# Patient Record
Sex: FEMALE | Race: WHITE | NOT HISPANIC OR LATINO | URBAN - METROPOLITAN AREA
[De-identification: names, ages, dates, MRNs, and addresses within clinical notes are randomized per-mention and may not be internally consistent; named-entity substitution may affect disease eponyms.]

---

## 2017-05-19 ENCOUNTER — OUTPATIENT (OUTPATIENT)
Dept: OUTPATIENT SERVICES | Facility: HOSPITAL | Age: 27
LOS: 1 days | Discharge: HOME | End: 2017-05-19

## 2017-06-28 DIAGNOSIS — Z00.01 ENCOUNTER FOR GENERAL ADULT MEDICAL EXAMINATION WITH ABNORMAL FINDINGS: ICD-10-CM

## 2017-06-28 DIAGNOSIS — R76.0 RAISED ANTIBODY TITER: ICD-10-CM

## 2017-06-28 DIAGNOSIS — R53.83 OTHER FATIGUE: ICD-10-CM

## 2017-06-28 DIAGNOSIS — M25.50 PAIN IN UNSPECIFIED JOINT: ICD-10-CM

## 2017-06-28 DIAGNOSIS — R63.5 ABNORMAL WEIGHT GAIN: ICD-10-CM

## 2017-07-05 ENCOUNTER — OUTPATIENT (OUTPATIENT)
Dept: OUTPATIENT SERVICES | Facility: HOSPITAL | Age: 27
LOS: 1 days | Discharge: HOME | End: 2017-07-05

## 2017-07-05 DIAGNOSIS — E04.9 NONTOXIC GOITER, UNSPECIFIED: ICD-10-CM

## 2018-01-16 ENCOUNTER — OUTPATIENT (OUTPATIENT)
Dept: OUTPATIENT SERVICES | Facility: HOSPITAL | Age: 28
LOS: 1 days | Discharge: HOME | End: 2018-01-16

## 2018-01-16 DIAGNOSIS — M25.511 PAIN IN RIGHT SHOULDER: ICD-10-CM

## 2018-09-22 ENCOUNTER — OUTPATIENT (OUTPATIENT)
Dept: OUTPATIENT SERVICES | Facility: HOSPITAL | Age: 28
LOS: 1 days | Discharge: HOME | End: 2018-09-22

## 2018-09-23 DIAGNOSIS — Z00.00 ENCOUNTER FOR GENERAL ADULT MEDICAL EXAMINATION WITHOUT ABNORMAL FINDINGS: ICD-10-CM

## 2018-09-23 DIAGNOSIS — R63.5 ABNORMAL WEIGHT GAIN: ICD-10-CM

## 2018-10-13 ENCOUNTER — EMERGENCY (EMERGENCY)
Facility: HOSPITAL | Age: 28
LOS: 0 days | Discharge: HOME | End: 2018-10-14
Attending: EMERGENCY MEDICINE | Admitting: EMERGENCY MEDICINE

## 2018-10-13 VITALS
OXYGEN SATURATION: 99 % | TEMPERATURE: 98 F | HEART RATE: 72 BPM | SYSTOLIC BLOOD PRESSURE: 127 MMHG | RESPIRATION RATE: 18 BRPM | HEIGHT: 65 IN | DIASTOLIC BLOOD PRESSURE: 84 MMHG | WEIGHT: 184.97 LBS

## 2018-10-13 DIAGNOSIS — R42 DIZZINESS AND GIDDINESS: ICD-10-CM

## 2018-10-13 DIAGNOSIS — R11.2 NAUSEA WITH VOMITING, UNSPECIFIED: ICD-10-CM

## 2018-10-13 DIAGNOSIS — M79.10 MYALGIA, UNSPECIFIED SITE: ICD-10-CM

## 2018-10-13 DIAGNOSIS — R50.9 FEVER, UNSPECIFIED: ICD-10-CM

## 2018-10-13 DIAGNOSIS — R51 HEADACHE: ICD-10-CM

## 2018-10-13 RX ORDER — MECLIZINE HCL 12.5 MG
25 TABLET ORAL ONCE
Qty: 0 | Refills: 0 | Status: COMPLETED | OUTPATIENT
Start: 2018-10-13 | End: 2018-10-13

## 2018-10-13 RX ORDER — ACETAMINOPHEN 500 MG
975 TABLET ORAL ONCE
Qty: 0 | Refills: 0 | Status: COMPLETED | OUTPATIENT
Start: 2018-10-13 | End: 2018-10-13

## 2018-10-13 RX ADMIN — Medication 975 MILLIGRAM(S): at 23:00

## 2018-10-13 RX ADMIN — Medication 25 MILLIGRAM(S): at 22:00

## 2018-10-13 NOTE — ED PROVIDER NOTE - CARE PLAN
Principal Discharge DX:	Disequilibrium  Assessment and plan of treatment:	Take Meclizine 1 tab up three times a day as needed for dizziness. Follow up with Neurology within 1 week (referral list provided). Return to the ED if you have a severe headache that does not go away, you have new changes in your vision, feel weak or confused, you have problems hearing, you have ringing or buzzing in your ears, have uncontrolled nausea or vomiting or for any other concern.

## 2018-10-13 NOTE — ED PROVIDER NOTE - OBJECTIVE STATEMENT
28F otherwise well endorsing "disequilibrium" 28F otherwise well endorsing "disequilibrium" for 1 week. Described as feeling off balance. Provoked by reading phone. Fast movement of head. Prolonged standing. Occasionally associated with left sided headache. No headache currently. vertigo, change in hearing, pain with jaw movement. No syncope. +n/v yesterday x 1. +myalgia for two days which resolved yesterday. Pt had documented fever at night 100.4 3 nights ago. Also endorsing body aches. No rash. Pt reports two weeks ago she noticed a tick fall out of her hair that was engorged with blood. No sick contacts. No recent travel. no neck pain.

## 2018-10-13 NOTE — ED ADULT TRIAGE NOTE - CHIEF COMPLAINT QUOTE
Patient states for the past week her equilibrium is off, she experienced 2 days out of the week body aches and fever, and she just feels off

## 2018-10-13 NOTE — ED PROVIDER NOTE - PLAN OF CARE
Take Meclizine 1 tab up three times a day as needed for dizziness. Follow up with Neurology within 1 week (referral list provided). Return to the ED if you have a severe headache that does not go away, you have new changes in your vision, feel weak or confused, you have problems hearing, you have ringing or buzzing in your ears, have uncontrolled nausea or vomiting or for any other concern.

## 2018-10-13 NOTE — ED PROVIDER NOTE - PHYSICAL EXAMINATION
VITAL SIGNS: noted  CONSTITUTIONAL: Well-developed; well-nourished; in no acute distress  HEAD: Normocephalic; atraumatic  EYES: PERRL, EOM intact; conjunctiva and sclera clear, no nystagmus. smooth saccade. No photophobia  ENT: No nasal discharge; airway clear. MMM  NECK: Supple; non tender. No anterior cervical lymphadenopathy noted  CARD: S1, S2 normal; no murmurs, gallops, or rubs. Regular rate and rhythm  RESP: CTAB/L, no wheezes, rales or rhonchi  ABD: Normal bowel sounds; soft; non-distended; non-tender; no hepatosplenomegaly. No CVA tenderness  EXT: Normal ROM. No calf tenderness or edema. Distal pulses intact  NEURO: AAO x 3, normal speech, no facial asymmetry, negative pronator drift, no ataxia, negative Romberg, no dysdiadokinesia, no nystagmus, peripheral vision intact, sensory equal and intact. finger to nose intact. can tandem gate, heel toe walk.   SKIN: Skin exam is warm and dry, no acute rash  MS: No midline spinal tenderness

## 2018-10-13 NOTE — ED PROVIDER NOTE - MEDICAL DECISION MAKING DETAILS
subjective disequilibrium in setting of known tick exposure and 1 episode of fever at night. No erythema migrans. No myalgia or headache or fever currently. No meningismus, neuro intact. Consider lyme vs optho vs post viral vertigo vs peripheral vertigo less likely central process. -lyme titer, antivert, upreg. reassess.

## 2018-10-14 VITALS
SYSTOLIC BLOOD PRESSURE: 99 MMHG | HEART RATE: 77 BPM | RESPIRATION RATE: 17 BRPM | OXYGEN SATURATION: 99 % | DIASTOLIC BLOOD PRESSURE: 61 MMHG | TEMPERATURE: 98 F

## 2018-10-14 RX ORDER — MECLIZINE HCL 12.5 MG
1 TABLET ORAL
Qty: 12 | Refills: 0 | OUTPATIENT
Start: 2018-10-14 | End: 2018-10-17

## 2018-10-14 NOTE — ED ADULT NURSE REASSESSMENT NOTE - NS ED NURSE REASSESS COMMENT FT1
pt is requesting her [facility], MD aware.

## 2018-10-15 LAB
B BURGDOR C6 AB SER-ACNC: NEGATIVE — SIGNIFICANT CHANGE UP
B BURGDOR IGG+IGM SER-ACNC: 0.06 INDEX — SIGNIFICANT CHANGE UP (ref 0.01–0.89)
LYME C6 AB IGG/IGM EIA REFLEX WESTERN BL: SIGNIFICANT CHANGE UP

## 2018-11-12 PROBLEM — N83.209 UNSPECIFIED OVARIAN CYST, UNSPECIFIED SIDE: Chronic | Status: ACTIVE | Noted: 2018-10-13

## 2018-11-15 ENCOUNTER — OUTPATIENT (OUTPATIENT)
Dept: OUTPATIENT SERVICES | Facility: HOSPITAL | Age: 28
LOS: 1 days | Discharge: HOME | End: 2018-11-15

## 2018-11-15 DIAGNOSIS — M25.361 OTHER INSTABILITY, RIGHT KNEE: ICD-10-CM

## 2018-11-15 DIAGNOSIS — M25.561 PAIN IN RIGHT KNEE: ICD-10-CM

## 2019-01-16 ENCOUNTER — OUTPATIENT (OUTPATIENT)
Dept: OUTPATIENT SERVICES | Facility: HOSPITAL | Age: 29
LOS: 1 days | Discharge: HOME | End: 2019-01-16

## 2019-01-16 VITALS
HEART RATE: 70 BPM | OXYGEN SATURATION: 99 % | WEIGHT: 184.97 LBS | RESPIRATION RATE: 16 BRPM | DIASTOLIC BLOOD PRESSURE: 72 MMHG | TEMPERATURE: 97 F | SYSTOLIC BLOOD PRESSURE: 118 MMHG | HEIGHT: 65 IN

## 2019-01-16 DIAGNOSIS — E01.8 OTHER IODINE-DEFICIENCY RELATED THYROID DISORDERS AND ALLIED CONDITIONS: ICD-10-CM

## 2019-01-16 DIAGNOSIS — M25.561 PAIN IN RIGHT KNEE: ICD-10-CM

## 2019-01-16 DIAGNOSIS — Z01.818 ENCOUNTER FOR OTHER PREPROCEDURAL EXAMINATION: ICD-10-CM

## 2019-01-16 LAB
BASOPHILS # BLD AUTO: 0.04 K/UL — SIGNIFICANT CHANGE UP (ref 0–0.2)
BASOPHILS NFR BLD AUTO: 0.6 % — SIGNIFICANT CHANGE UP (ref 0–1)
EOSINOPHIL # BLD AUTO: 0.14 K/UL — SIGNIFICANT CHANGE UP (ref 0–0.7)
EOSINOPHIL NFR BLD AUTO: 2 % — SIGNIFICANT CHANGE UP (ref 0–8)
HCT VFR BLD CALC: 43.8 % — SIGNIFICANT CHANGE UP (ref 37–47)
HGB BLD-MCNC: 14.5 G/DL — SIGNIFICANT CHANGE UP (ref 12–16)
IMM GRANULOCYTES NFR BLD AUTO: 0.1 % — SIGNIFICANT CHANGE UP (ref 0.1–0.3)
LYMPHOCYTES # BLD AUTO: 1.59 K/UL — SIGNIFICANT CHANGE UP (ref 1.2–3.4)
LYMPHOCYTES # BLD AUTO: 22.9 % — SIGNIFICANT CHANGE UP (ref 20.5–51.1)
MCHC RBC-ENTMCNC: 28.6 PG — SIGNIFICANT CHANGE UP (ref 27–31)
MCHC RBC-ENTMCNC: 33.1 G/DL — SIGNIFICANT CHANGE UP (ref 32–37)
MCV RBC AUTO: 86.4 FL — SIGNIFICANT CHANGE UP (ref 81–99)
MONOCYTES # BLD AUTO: 0.75 K/UL — HIGH (ref 0.1–0.6)
MONOCYTES NFR BLD AUTO: 10.8 % — HIGH (ref 1.7–9.3)
NEUTROPHILS # BLD AUTO: 4.4 K/UL — SIGNIFICANT CHANGE UP (ref 1.4–6.5)
NEUTROPHILS NFR BLD AUTO: 63.6 % — SIGNIFICANT CHANGE UP (ref 42.2–75.2)
NRBC # BLD: 0 /100 WBCS — SIGNIFICANT CHANGE UP (ref 0–0)
PLATELET # BLD AUTO: 317 K/UL — SIGNIFICANT CHANGE UP (ref 130–400)
RBC # BLD: 5.07 M/UL — SIGNIFICANT CHANGE UP (ref 4.2–5.4)
RBC # FLD: 12.5 % — SIGNIFICANT CHANGE UP (ref 11.5–14.5)
WBC # BLD: 6.93 K/UL — SIGNIFICANT CHANGE UP (ref 4.8–10.8)
WBC # FLD AUTO: 6.93 K/UL — SIGNIFICANT CHANGE UP (ref 4.8–10.8)

## 2019-01-16 RX ORDER — IBUPROFEN 200 MG
1 TABLET ORAL
Qty: 0 | Refills: 0 | COMMUNITY

## 2019-01-16 NOTE — H&P PST ADULT - ADDITIONAL PE
no paulo no loose teeth tmd > 3 fbd neck from thyroid fullness no bruits no masses dec rom rt knee crepitus

## 2019-01-16 NOTE — H&P PST ADULT - REASON FOR ADMISSION
28  yr olf female to past for  right knee anterior cruciate ligament   reconstruction with autograft possible meniscus repair per pt 10/18 playing volleyball injury to right knee saw md had mri "complete tear " now for sx c/o dec rom and instability to knee buckling no fever no pain no uri uti cp palp sob at this time exercise tolerance is 3 flights no changes no paulo screen revd pt with fullness to neck "I have a big thyroid" many yrs had sono and recent labs 9/18 on [facility] clinically euthyroid not on any meds ever no dysphagia no dysphonia

## 2019-01-22 NOTE — ASU PATIENT PROFILE, ADULT - ANESTHESIA, PREVIOUS REACTION, PROFILE
no psh no fam h/o ane issues  or bleeding disorders no psh no fam h/o ane issues  or bleeding disorders/unknown

## 2019-01-23 ENCOUNTER — OUTPATIENT (OUTPATIENT)
Dept: OUTPATIENT SERVICES | Facility: HOSPITAL | Age: 29
LOS: 1 days | Discharge: HOME | End: 2019-01-23

## 2019-01-23 ENCOUNTER — RESULT REVIEW (OUTPATIENT)
Age: 29
End: 2019-01-23

## 2019-01-23 VITALS
DIASTOLIC BLOOD PRESSURE: 63 MMHG | SYSTOLIC BLOOD PRESSURE: 116 MMHG | RESPIRATION RATE: 17 BRPM | HEIGHT: 65 IN | OXYGEN SATURATION: 100 % | TEMPERATURE: 98 F | WEIGHT: 184.97 LBS | HEART RATE: 61 BPM

## 2019-01-23 VITALS
HEART RATE: 61 BPM | RESPIRATION RATE: 22 BRPM | TEMPERATURE: 98 F | DIASTOLIC BLOOD PRESSURE: 58 MMHG | SYSTOLIC BLOOD PRESSURE: 105 MMHG | OXYGEN SATURATION: 97 %

## 2019-01-23 RX ORDER — SODIUM CHLORIDE 9 MG/ML
1000 INJECTION, SOLUTION INTRAVENOUS
Qty: 0 | Refills: 0 | Status: DISCONTINUED | OUTPATIENT
Start: 2019-01-23 | End: 2019-02-07

## 2019-01-23 RX ORDER — ONDANSETRON 8 MG/1
4 TABLET, FILM COATED ORAL ONCE
Qty: 0 | Refills: 0 | Status: COMPLETED | OUTPATIENT
Start: 2019-01-23 | End: 2019-01-23

## 2019-01-23 RX ORDER — HYDROMORPHONE HYDROCHLORIDE 2 MG/ML
0.5 INJECTION INTRAMUSCULAR; INTRAVENOUS; SUBCUTANEOUS
Qty: 0 | Refills: 0 | Status: DISCONTINUED | OUTPATIENT
Start: 2019-01-23 | End: 2019-01-23

## 2019-01-23 RX ORDER — OXYCODONE AND ACETAMINOPHEN 5; 325 MG/1; MG/1
1 TABLET ORAL ONCE
Qty: 0 | Refills: 0 | Status: DISCONTINUED | OUTPATIENT
Start: 2019-01-23 | End: 2019-01-23

## 2019-01-23 RX ADMIN — HYDROMORPHONE HYDROCHLORIDE 0.5 MILLIGRAM(S): 2 INJECTION INTRAMUSCULAR; INTRAVENOUS; SUBCUTANEOUS at 12:30

## 2019-01-23 RX ADMIN — HYDROMORPHONE HYDROCHLORIDE 0.5 MILLIGRAM(S): 2 INJECTION INTRAMUSCULAR; INTRAVENOUS; SUBCUTANEOUS at 11:16

## 2019-01-23 RX ADMIN — ONDANSETRON 4 MILLIGRAM(S): 8 TABLET, FILM COATED ORAL at 12:12

## 2019-01-23 RX ADMIN — SODIUM CHLORIDE 100 MILLILITER(S): 9 INJECTION, SOLUTION INTRAVENOUS at 10:46

## 2019-01-23 NOTE — BRIEF OPERATIVE NOTE - PROCEDURE
<<-----Click on this checkbox to enter Procedure Arthroscopy, knee, with ACL reconstruction using patellar tendon autograft  01/23/2019    Active  JEWEL

## 2019-01-23 NOTE — BRIEF OPERATIVE NOTE - PRE-OP DX
Rupture of anterior cruciate ligament of right knee, initial encounter  01/23/2019    Active  Duong Cowart

## 2019-01-23 NOTE — CHART NOTE - NSCHARTNOTEFT_GEN_A_CORE
PACU ANESTHESIA ADMISSION NOTE      Procedure: Right ACL repair, synovectomy, patellar tendon repair  Post op diagnosis:  Right ACL tear    ____  Intubated  TV:______       Rate: ______      FiO2: ______    _x___  Patent Airway    _x___  Full return of protective reflexes    __x__  Full recovery from anesthesia / back to baseline     Vitals:   T:   98.1F        R:    16              BP:  111/57               Sat:     98              P: 77      Mental Status:  _x___ Awake   ___x__ Alert   _____ Drowsy   _____ Sedated    Nausea/Vomiting:  _x___ NO  ______Yes,   See Post - Op Orders          Pain Scale (0-10):  __0/10___    Treatment: ____ None    __x__ See Post - Op/PCA Orders    Post - Operative Fluids:   ____ Oral   __x__ See Post - Op Orders    Plan: Discharge:   _x___Home       _____Floor     _____Critical Care    _____  Other:_________________    Comments: pt tolerated procedure well, no anesthesia related complications. Care of pt endorsed to PACU, report given to PACU RN.

## 2019-01-23 NOTE — ASU DISCHARGE PLAN (ADULT/PEDIATRIC). - SPECIAL INSTRUCTIONS
Post Operative Instructions for Knee Surgery    Your Surgery Included  [ ] Menisectomy  [ ] Meniscus Repair					  [x ] Synovectomy/Plica Excision	  [ ] Debridement/Chondroplasty  [ ] Lysis of Adhesions  [x ] ACL reconstruction			  [ ] PCL Reconstruction  [ ] Other:  	  Call our office ([phone]) immediately if you experience any of the following:  •	Excessive bleeding or pus like drainage at the incision site  •	Uncontrollable pain not relieved by pain medication  •	Excessive swelling or redness at the incision site  •	Fever above 101.5 degrees not controlled with Tylenol or Motrin  •	Shortness of Breath  •	Any foul odor or blistering from the surgery site    Pain Management: You were given one or more prescriptions before leaving the hospital. Have the prescriptions filled at a pharmacy on your way home and follow the instructions on the bottles.   Regional Anesthesia Injections (Blocks): You may have been given a regional nerve block either before or after surgery. This may numb your leg for 24-36 hours  	*Proceed with caution when weight bearing on your leg    Diet: Eat a bland diet for the first day after surgery. Progress your diet as tolerated. Constipation may occur with Narcotic usage, contact our office if you are experiencing constipation.    Activity: Limit your activity during the first 48 hours, keep your leg elevated with pillows under your heel. After the first 48 hours at home, increase your activity level based on your symptoms.    Dressing Change: Remove the dressing on the 3rd day. It is normal for some blood to be seen on the dressings. It is also normal for you to see apparent bruising on the skin around your knee when you remove the dressing. If present, leave the steri-strip tape across the incisions. If you are concerned by the drainage or the appearance of your knee, please call one of the numbers listed. Keep covered with Band-Aids/bandages.    Showering: You may shower on the 5th day after surgery if the wound is dry and clean, but do not let the wound soak in water until sutures are removed. Do not submerge in any water until after your postoperative appointment in clinic.    Weight Bearing:						  [ ] Weight bearing as tolerated		  [x ] Nonweight bearing				  [ ] Other:						    Operative Knee Range of Motion  [ ] Full range of motion  [x ] ROM 0-90 deg  [ ] Other:    Knee Exercises: You may do these exercises for 2-5 mins five times a day in order to help regain your range of motion.  [x ] Quad Sets: Begin activating your quadriceps muscle by driving your knee downward into full knee extension while seated on a table or bed   with a towel rolled and propped under your heel  [x ] Straight Leg Raise: While marina your quadriceps muscle, lift your fully extended leg to the level of your non-operative knee  [ ] Heel Slides: With the knee straight, slide your heel slowly toward your buttocks, hold at the endpoint for 10-15 seconds, then slowly straighten  [x ] Ankle Pumps: With your knee straight, move your ankle in a "pumping"  fashion to activate your calf and leg muscle     Follow Up: As Scheduled

## 2019-01-24 LAB — SURGICAL PATHOLOGY STUDY: SIGNIFICANT CHANGE UP

## 2019-01-28 DIAGNOSIS — S83.511A SPRAIN OF ANTERIOR CRUCIATE LIGAMENT OF RIGHT KNEE, INITIAL ENCOUNTER: ICD-10-CM
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
## 2019-01-28 DIAGNOSIS — M65.9 SYNOVITIS AND TENOSYNOVITIS, UNSPECIFIED: ICD-10-CM
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
## 2019-03-20 ENCOUNTER — OUTPATIENT (OUTPATIENT)
Dept: OUTPATIENT SERVICES | Facility: HOSPITAL | Age: 29
LOS: 1 days | Discharge: HOME | End: 2019-03-20

## 2019-03-20 DIAGNOSIS — E07.9 DISORDER OF THYROID, UNSPECIFIED: ICD-10-CM

## 2019-05-22 ENCOUNTER — OUTPATIENT (OUTPATIENT)
Dept: OUTPATIENT SERVICES | Facility: HOSPITAL | Age: 29
LOS: 1 days | Discharge: HOME | End: 2019-05-22
Payer: COMMERCIAL

## 2019-05-22 ENCOUNTER — RESULT REVIEW (OUTPATIENT)
Age: 29
End: 2019-05-22

## 2019-05-22 VITALS
HEART RATE: 70 BPM | OXYGEN SATURATION: 98 % | RESPIRATION RATE: 18 BRPM | TEMPERATURE: 98 F | DIASTOLIC BLOOD PRESSURE: 66 MMHG | SYSTOLIC BLOOD PRESSURE: 107 MMHG

## 2019-05-22 VITALS
WEIGHT: 184.97 LBS | DIASTOLIC BLOOD PRESSURE: 57 MMHG | SYSTOLIC BLOOD PRESSURE: 112 MMHG | HEIGHT: 65 IN | TEMPERATURE: 99 F | OXYGEN SATURATION: 100 % | HEART RATE: 62 BPM | RESPIRATION RATE: 18 BRPM

## 2019-05-22 DIAGNOSIS — M24.669 ANKYLOSIS, UNSPECIFIED KNEE: ICD-10-CM

## 2019-05-22 DIAGNOSIS — S83.519A SPRAIN OF ANTERIOR CRUCIATE LIGAMENT OF UNSPECIFIED KNEE, INITIAL ENCOUNTER: Chronic | ICD-10-CM

## 2019-05-22 PROCEDURE — 88304 TISSUE EXAM BY PATHOLOGIST: CPT | Mod: 26

## 2019-05-22 RX ORDER — SODIUM CHLORIDE 9 MG/ML
1000 INJECTION, SOLUTION INTRAVENOUS
Refills: 0 | Status: DISCONTINUED | OUTPATIENT
Start: 2019-05-22 | End: 2019-06-06

## 2019-05-22 RX ORDER — MORPHINE SULFATE 50 MG/1
2 CAPSULE, EXTENDED RELEASE ORAL
Refills: 0 | Status: DISCONTINUED | OUTPATIENT
Start: 2019-05-22 | End: 2019-05-22

## 2019-05-22 RX ORDER — OXYCODONE AND ACETAMINOPHEN 5; 325 MG/1; MG/1
1 TABLET ORAL ONCE
Refills: 0 | Status: DISCONTINUED | OUTPATIENT
Start: 2019-05-22 | End: 2019-05-22

## 2019-05-22 RX ORDER — IBUPROFEN 200 MG
2 TABLET ORAL
Qty: 0 | Refills: 0 | DISCHARGE

## 2019-05-22 RX ORDER — HYDROMORPHONE HYDROCHLORIDE 2 MG/ML
0.5 INJECTION INTRAMUSCULAR; INTRAVENOUS; SUBCUTANEOUS
Refills: 0 | Status: DISCONTINUED | OUTPATIENT
Start: 2019-05-22 | End: 2019-05-22

## 2019-05-22 RX ORDER — ONDANSETRON 8 MG/1
4 TABLET, FILM COATED ORAL ONCE
Refills: 0 | Status: DISCONTINUED | OUTPATIENT
Start: 2019-05-22 | End: 2019-06-06

## 2019-05-22 RX ADMIN — OXYCODONE AND ACETAMINOPHEN 1 TABLET(S): 5; 325 TABLET ORAL at 10:44

## 2019-05-22 RX ADMIN — SODIUM CHLORIDE 100 MILLILITER(S): 9 INJECTION, SOLUTION INTRAVENOUS at 09:59

## 2019-05-22 RX ADMIN — OXYCODONE AND ACETAMINOPHEN 1 TABLET(S): 5; 325 TABLET ORAL at 11:22

## 2019-05-22 NOTE — ASU DISCHARGE PLAN (ADULT/PEDIATRIC) - ASU DC SPECIAL INSTRUCTIONSFT
Post Operative Instructions for Knee Surgery    Your Surgery Included  [ ] Menisectomy  [ ] Meniscus Repair					  [ ] Synovectomy/Plica Excision	  [ ] Debridement/Chondroplasty  [ x] Lysis of Adhesions  [ ] ACL reconstruction			  [ ] PCL Reconstruction  [ ] Other:  	  Call our office ([phone]) immediately if you experience any of the following:  •	Excessive bleeding or pus like drainage at the incision site  •	Uncontrollable pain not relieved by pain medication  •	Excessive swelling or redness at the incision site  •	Fever above 101.5 degrees not controlled with Tylenol or Motrin  •	Shortness of Breath  •	Any foul odor or blistering from the surgery site    Pain Management: You were given one or more prescriptions before leaving the hospital. Have the prescriptions filled at a pharmacy on your way home and follow the instructions on the bottles.   Regional Anesthesia Injections (Blocks): You may have been given a regional nerve block either before or after surgery. This may numb your leg for 24-36 hours  	*Proceed with caution when weight bearing on your leg    Diet: Eat a bland diet for the first day after surgery. Progress your diet as tolerated. Constipation may occur with Narcotic usage, contact our office if you are experiencing constipation.    Activity: Limit your activity during the first 48 hours, keep your leg elevated with pillows under your heel. After the first 48 hours at home, increase your activity level based on your symptoms.    Dressing Change: Remove the dressing on the 3rd day. It is normal for some blood to be seen on the dressings. It is also normal for you to see apparent bruising on the skin around your knee when you remove the dressing. If present, leave the steri-strip tape across the incisions. If you are concerned by the drainage or the appearance of your knee, please call one of the numbers listed. Keep covered with Band-Aids/bandages.    Showering: You may shower on the 5th day after surgery if the wound is dry and clean, but do not let the wound soak in water until sutures are removed. Do not submerge in any water until after your postoperative appointment in clinic.    Weight Bearing:						  [ x] Weight bearing as tolerated		  [ ] Nonweight bearing				  [ ] Other:						    Operative Knee Range of Motion  [x ] Full range of motion  [ ] ROM 0-90 deg  [ ] Other:    Knee Exercises: You may do these exercises for 2-5 mins five times a day in order to help regain your range of motion.  [ x] Quad Sets: Begin activating your quadriceps muscle by driving your knee downward into full knee extension while seated on a table or bed   with a towel rolled and propped under your heel  [ x] Straight Leg Raise: While marina your quadriceps muscle, lift your fully extended leg to the level of your non-operative knee  [ x] Heel Slides: With the knee straight, slide your heel slowly toward your buttocks, hold at the endpoint for 10-15 seconds, then slowly straighten  [x ] Ankle Pumps: With your knee straight, move your ankle in a "pumping"  fashion to activate your calf and leg muscle     Follow Up: As Scheduled

## 2019-05-22 NOTE — BRIEF OPERATIVE NOTE - NSICDXBRIEFPROCEDURE_GEN_ALL_CORE_FT
PROCEDURES:  Manipulation, knee 22-May-2019 08:44:48  Duong Cowart  Arthroscopic lysis, adhesions, knee 22-May-2019 08:44:38  Duong Cowart

## 2019-05-22 NOTE — CHART NOTE - NSCHARTNOTEFT_GEN_A_CORE
PACU ANESTHESIA ADMISSION NOTE      Procedure: Manipulation, knee  Arthroscopic lysis, adhesions, knee    Post op diagnosis:  Arthrofibrosis of knee joint      ____  Intubated  TV:______       Rate: ______      FiO2: ______    __x__  Patent Airway    ____  Full return of protective reflexes    ____  Full recovery from anesthesia / back to baseline     Vitals:   T:  98F         R:16                  BP: 108/62                 Sat:  100                 P: 84      Mental Status:  __x__ Awake   _____ Alert   _____ Drowsy   _____ Sedated    Nausea/Vomiting:  __x__ NO  ______Yes,   See Post - Op Orders          Pain Scale (0-10):  0____    Treatment: ____ None    ____ See Post - Op/PCA Orders    Post - Operative Fluids:   ____ Oral   x____ See Post - Op Orders    Plan: Discharge:   __x__Home       _____Floor     _____Critical Care    _____  Other:_________________    Comments: Pt awake, VS stable, no anesthesia complications.

## 2019-05-24 LAB — SURGICAL PATHOLOGY STUDY: SIGNIFICANT CHANGE UP

## 2019-05-27 DIAGNOSIS — M24.661 ANKYLOSIS, RIGHT KNEE: ICD-10-CM

## 2020-04-26 ENCOUNTER — MESSAGE (OUTPATIENT)
Age: 30
End: 2020-04-26

## 2020-05-02 ENCOUNTER — APPOINTMENT (OUTPATIENT)
Dept: DISASTER EMERGENCY | Facility: HOSPITAL | Age: 30
End: 2020-05-02

## 2020-05-04 LAB
SARS-COV-2 IGG SERPL IA-ACNC: 1.6 INDEX
SARS-COV-2 IGG SERPL QL IA: POSITIVE

## 2020-10-21 NOTE — H&P PST ADULT - NSICDXNOFAMILYHX_GEN_ALL_CORE
On Plavix at home.  Holding maintenance medication during acute illness.   Resume per Vascular Surgery recommendation    <-- Click to add NO pertinent Family History

## 2021-02-27 ENCOUNTER — LABORATORY RESULT (OUTPATIENT)
Age: 31
End: 2021-02-27

## 2021-07-27 ENCOUNTER — EMERGENCY (EMERGENCY)
Facility: HOSPITAL | Age: 31
LOS: 1 days | Discharge: HOME | End: 2021-07-27
Attending: EMERGENCY MEDICINE | Admitting: INTERNAL MEDICINE
Payer: COMMERCIAL

## 2021-07-27 ENCOUNTER — TRANSCRIPTION ENCOUNTER (OUTPATIENT)
Age: 31
End: 2021-07-27

## 2021-07-27 VITALS — WEIGHT: 190.04 LBS | HEIGHT: 65 IN

## 2021-07-27 VITALS
OXYGEN SATURATION: 98 % | DIASTOLIC BLOOD PRESSURE: 66 MMHG | TEMPERATURE: 98 F | HEART RATE: 70 BPM | SYSTOLIC BLOOD PRESSURE: 125 MMHG | RESPIRATION RATE: 18 BRPM

## 2021-07-27 DIAGNOSIS — S83.519A SPRAIN OF ANTERIOR CRUCIATE LIGAMENT OF UNSPECIFIED KNEE, INITIAL ENCOUNTER: Chronic | ICD-10-CM

## 2021-07-27 DIAGNOSIS — Z87.42 PERSONAL HISTORY OF OTHER DISEASES OF THE FEMALE GENITAL TRACT: ICD-10-CM

## 2021-07-27 DIAGNOSIS — R00.2 PALPITATIONS: ICD-10-CM

## 2021-07-27 PROCEDURE — 99285 EMERGENCY DEPT VISIT HI MDM: CPT

## 2021-07-27 PROCEDURE — ZZZZZ: CPT

## 2021-07-27 PROCEDURE — 93010 ELECTROCARDIOGRAM REPORT: CPT

## 2021-07-28 LAB
ALBUMIN SERPL ELPH-MCNC: 4.3 G/DL — SIGNIFICANT CHANGE UP (ref 3.5–5.2)
ALP SERPL-CCNC: 119 U/L — HIGH (ref 30–115)
ALT FLD-CCNC: 23 U/L — SIGNIFICANT CHANGE UP (ref 0–41)
ANION GAP SERPL CALC-SCNC: 12 MMOL/L — SIGNIFICANT CHANGE UP (ref 7–14)
AST SERPL-CCNC: 21 U/L — SIGNIFICANT CHANGE UP (ref 0–41)
BASOPHILS # BLD AUTO: 0.06 K/UL — SIGNIFICANT CHANGE UP (ref 0–0.2)
BASOPHILS NFR BLD AUTO: 0.7 % — SIGNIFICANT CHANGE UP (ref 0–1)
BILIRUB SERPL-MCNC: <0.2 MG/DL — SIGNIFICANT CHANGE UP (ref 0.2–1.2)
BUN SERPL-MCNC: 18 MG/DL — SIGNIFICANT CHANGE UP (ref 10–20)
CALCIUM SERPL-MCNC: 9.6 MG/DL — SIGNIFICANT CHANGE UP (ref 8.5–10.1)
CHLORIDE SERPL-SCNC: 104 MMOL/L — SIGNIFICANT CHANGE UP (ref 98–110)
CO2 SERPL-SCNC: 25 MMOL/L — SIGNIFICANT CHANGE UP (ref 17–32)
CREAT SERPL-MCNC: 0.9 MG/DL — SIGNIFICANT CHANGE UP (ref 0.7–1.5)
EOSINOPHIL # BLD AUTO: 0.31 K/UL — SIGNIFICANT CHANGE UP (ref 0–0.7)
EOSINOPHIL NFR BLD AUTO: 3.4 % — SIGNIFICANT CHANGE UP (ref 0–8)
GLUCOSE SERPL-MCNC: 92 MG/DL — SIGNIFICANT CHANGE UP (ref 70–99)
HCG SERPL QL: NEGATIVE — SIGNIFICANT CHANGE UP
HCT VFR BLD CALC: 40.4 % — SIGNIFICANT CHANGE UP (ref 37–47)
HGB BLD-MCNC: 13.3 G/DL — SIGNIFICANT CHANGE UP (ref 12–16)
IMM GRANULOCYTES NFR BLD AUTO: 0.3 % — SIGNIFICANT CHANGE UP (ref 0.1–0.3)
LYMPHOCYTES # BLD AUTO: 2.99 K/UL — SIGNIFICANT CHANGE UP (ref 1.2–3.4)
LYMPHOCYTES # BLD AUTO: 32.5 % — SIGNIFICANT CHANGE UP (ref 20.5–51.1)
MAGNESIUM SERPL-MCNC: 2.2 MG/DL — SIGNIFICANT CHANGE UP (ref 1.8–2.4)
MCHC RBC-ENTMCNC: 29.2 PG — SIGNIFICANT CHANGE UP (ref 27–31)
MCHC RBC-ENTMCNC: 32.9 G/DL — SIGNIFICANT CHANGE UP (ref 32–37)
MCV RBC AUTO: 88.8 FL — SIGNIFICANT CHANGE UP (ref 81–99)
MONOCYTES # BLD AUTO: 1.09 K/UL — HIGH (ref 0.1–0.6)
MONOCYTES NFR BLD AUTO: 11.9 % — HIGH (ref 1.7–9.3)
NEUTROPHILS # BLD AUTO: 4.71 K/UL — SIGNIFICANT CHANGE UP (ref 1.4–6.5)
NEUTROPHILS NFR BLD AUTO: 51.2 % — SIGNIFICANT CHANGE UP (ref 42.2–75.2)
NRBC # BLD: 0 /100 WBCS — SIGNIFICANT CHANGE UP (ref 0–0)
PLATELET # BLD AUTO: 327 K/UL — SIGNIFICANT CHANGE UP (ref 130–400)
POTASSIUM SERPL-MCNC: 4 MMOL/L — SIGNIFICANT CHANGE UP (ref 3.5–5)
POTASSIUM SERPL-SCNC: 4 MMOL/L — SIGNIFICANT CHANGE UP (ref 3.5–5)
PROT SERPL-MCNC: 7 G/DL — SIGNIFICANT CHANGE UP (ref 6–8)
RBC # BLD: 4.55 M/UL — SIGNIFICANT CHANGE UP (ref 4.2–5.4)
RBC # FLD: 12.1 % — SIGNIFICANT CHANGE UP (ref 11.5–14.5)
SODIUM SERPL-SCNC: 141 MMOL/L — SIGNIFICANT CHANGE UP (ref 135–146)
TROPONIN T SERPL-MCNC: <0.01 NG/ML — SIGNIFICANT CHANGE UP
WBC # BLD: 9.19 K/UL — SIGNIFICANT CHANGE UP (ref 4.8–10.8)
WBC # FLD AUTO: 9.19 K/UL — SIGNIFICANT CHANGE UP (ref 4.8–10.8)

## 2021-07-28 PROCEDURE — 93010 ELECTROCARDIOGRAM REPORT: CPT

## 2021-07-28 PROCEDURE — 71045 X-RAY EXAM CHEST 1 VIEW: CPT | Mod: 26

## 2021-07-28 NOTE — ED PROVIDER NOTE - CLINICAL SUMMARY MEDICAL DECISION MAKING FREE TEXT BOX
31yF  no pmhx  pw palpitations intermittent for a few weeks.  no lightheadedness syncope diaphoresis -  had normal thyroid function  outpt.  no extra caffeine use  N chest pain or sob. No leg pain swelling hx of hormone use or recent immobilization, or hx of dvt.  Alert well appearing no no diaphoresis, no exophthalmos, no thyromegaly. pupils equal reactive, cvs rrr no murmurs  , resp: cta no tachypnea, abd soft nontender  2 + radial pulse b/l equal, No tremors or rigidity. no le edema.  ekg nsr  labs and electrolytes wnl    Patient to be discharged from ED in well appearing condition. Any available test results were discussed with and printed  for patient.  Verbal instructions given, including instructions to return to ED immediately for any new, worsening, or concerning symptoms. Limitations of ED work up discussed.  Patient reports understanding of above with capacity and insight. Written discharge instructions additionally given, including follow-up plan with cardiology

## 2021-07-28 NOTE — ED PROVIDER NOTE - PATIENT PORTAL LINK FT
You can access the FollowMyHealth Patient Portal offered by [facility] by registering at the following website: [email or web address]. By joining [employer]’s FollowMyHealth portal, you will also be able to view your health information using other applications (apps) compatible with our system.

## 2021-07-28 NOTE — ED PROVIDER NOTE - NSFOLLOWUPINSTRUCTIONS_ED_ALL_ED_FT
Make appointment with cardiology outpt  =  Palpitations  ImageA palpitation is the feeling that your heartbeat is irregular or is faster than normal. It may feel like your heart is fluttering or skipping a beat. Palpitations are usually not a serious problem. They may be caused by many things, including smoking, caffeine, alcohol, stress, and certain medicines. Although most causes of palpitations are not serious, palpitations can be a sign of a serious medical problem. In some cases, you may need further medical evaluation.    Follow these instructions at home:  Pay attention to any changes in your symptoms. Take these actions to help with your condition:    Avoid the following:    Caffeinated coffee, tea, soft drinks, diet pills, and energy drinks.  Chocolate.  Alcohol.    Do not use any tobacco products, such as cigarettes, chewing tobacco, and e-cigarettes. If you need help quitting, ask your health care provider.  Try to reduce your stress and anxiety. Things that can help you relax include:    Yoga.  Meditation.  Physical activity, such as swimming, jogging, or walking.  Biofeedback. This is a method that helps you learn to use your mind to control things in your body, such as your heartbeats.    Get plenty of rest and sleep.  Take over-the-counter and prescription medicines only as told by your health care provider.  Keep all follow-up visits as told by your health care provider. This is important.    Contact a health care provider if:  You continue to have a fast or irregular heartbeat after 24 hours.  Your palpitations occur more often.  Get help right away if:  You have chest pain or shortness of breath.  You have a severe headache.  You feel dizzy or you faint.  This information is not intended to replace advice given to you by your health care provider. Make sure you discuss any questions you have with your health care provider.

## 2021-07-28 NOTE — ED PROVIDER NOTE - NS ED ROS FT
Review of Systems:  	•	CONSTITUTIONAL - no fever, no diaphoresis, no chills  	•	SKIN - no rash  	•	HEMATOLOGIC - no bleeding, no bruising  	•	EYES - no eye pain, no blurry vision  	•	ENT - no change in hearing, no sore throat, no ear pain or tinnitus  	•	RESPIRATORY - no shortness of breath, no cough  	•	CARDIAC - no chest pain, palpitations  	•	GI - no abd pain, no nausea, no vomiting, no diarrhea, no constipation    	•	MUSCULOSKELETAL - no joint paint, no swelling, no redness  	•	NEUROLOGIC - no weakness, no headache, no paresthesias, no LOC  	•	PSYCH - no anxiety, non suicidal, non homicidal, no hallucination, no depression

## 2021-07-28 NOTE — ED PROVIDER NOTE - CARE PROVIDER_API CALL
Charles Jackson  CARDIOLOGY  [address]  [place] [address]  Phone: [phone]  Fax: [phone]  Follow Up Time:     Kingsley Allen)  Cardiovascular Disease; Internal Medicine  [address] [place] [address]  Phone: [phone]  Fax: [phone]  Follow Up Time:

## 2021-07-28 NOTE — ED PROVIDER NOTE - CARE PROVIDERS DIRECT ADDRESSES
[email or web address]

## 2021-07-28 NOTE — ED PROVIDER NOTE - OBJECTIVE STATEMENT
this is 32 yo female presents to ed for evaluation of palpitations. patient states that this has been going on intermittently for several weeks. patient denies any chest pain for shortness of breath.   no dizziness.

## 2021-09-29 ENCOUNTER — EMERGENCY (EMERGENCY)
Facility: HOSPITAL | Age: 31
LOS: 0 days | Discharge: HOME | End: 2021-09-30
Attending: EMERGENCY MEDICINE | Admitting: EMERGENCY MEDICINE
Payer: COMMERCIAL

## 2021-09-29 VITALS
HEIGHT: 65 IN | WEIGHT: 195.11 LBS | OXYGEN SATURATION: 98 % | RESPIRATION RATE: 18 BRPM | TEMPERATURE: 101 F | HEART RATE: 80 BPM | DIASTOLIC BLOOD PRESSURE: 70 MMHG | SYSTOLIC BLOOD PRESSURE: 118 MMHG

## 2021-09-29 VITALS
HEART RATE: 73 BPM | SYSTOLIC BLOOD PRESSURE: 105 MMHG | RESPIRATION RATE: 18 BRPM | DIASTOLIC BLOOD PRESSURE: 62 MMHG | TEMPERATURE: 99 F | OXYGEN SATURATION: 98 %

## 2021-09-29 DIAGNOSIS — Z87.42 PERSONAL HISTORY OF OTHER DISEASES OF THE FEMALE GENITAL TRACT: ICD-10-CM

## 2021-09-29 DIAGNOSIS — Z20.822 CONTACT WITH AND (SUSPECTED) EXPOSURE TO COVID-19: ICD-10-CM

## 2021-09-29 DIAGNOSIS — S83.519A SPRAIN OF ANTERIOR CRUCIATE LIGAMENT OF UNSPECIFIED KNEE, INITIAL ENCOUNTER: Chronic | ICD-10-CM

## 2021-09-29 DIAGNOSIS — R19.7 DIARRHEA, UNSPECIFIED: ICD-10-CM

## 2021-09-29 DIAGNOSIS — R11.2 NAUSEA WITH VOMITING, UNSPECIFIED: ICD-10-CM

## 2021-09-29 LAB
ALBUMIN SERPL ELPH-MCNC: 4.1 G/DL — SIGNIFICANT CHANGE UP (ref 3.5–5.2)
ALP SERPL-CCNC: 100 U/L — SIGNIFICANT CHANGE UP (ref 30–115)
ALT FLD-CCNC: 26 U/L — SIGNIFICANT CHANGE UP (ref 0–41)
ANION GAP SERPL CALC-SCNC: 13 MMOL/L — SIGNIFICANT CHANGE UP (ref 7–14)
APPEARANCE UR: CLEAR — SIGNIFICANT CHANGE UP
AST SERPL-CCNC: 23 U/L — SIGNIFICANT CHANGE UP (ref 0–41)
BASOPHILS # BLD AUTO: 0.01 K/UL — SIGNIFICANT CHANGE UP (ref 0–0.2)
BASOPHILS NFR BLD AUTO: 0.1 % — SIGNIFICANT CHANGE UP (ref 0–1)
BILIRUB DIRECT SERPL-MCNC: <0.2 MG/DL — SIGNIFICANT CHANGE UP (ref 0–0.2)
BILIRUB INDIRECT FLD-MCNC: >0.3 MG/DL — SIGNIFICANT CHANGE UP (ref 0.2–1.2)
BILIRUB SERPL-MCNC: 0.5 MG/DL — SIGNIFICANT CHANGE UP (ref 0.2–1.2)
BILIRUB UR-MCNC: NEGATIVE — SIGNIFICANT CHANGE UP
BUN SERPL-MCNC: 15 MG/DL — SIGNIFICANT CHANGE UP (ref 10–20)
CALCIUM SERPL-MCNC: 8.9 MG/DL — SIGNIFICANT CHANGE UP (ref 8.5–10.1)
CHLORIDE SERPL-SCNC: 98 MMOL/L — SIGNIFICANT CHANGE UP (ref 98–110)
CO2 SERPL-SCNC: 24 MMOL/L — SIGNIFICANT CHANGE UP (ref 17–32)
COLOR SPEC: YELLOW — SIGNIFICANT CHANGE UP
CREAT SERPL-MCNC: 0.8 MG/DL — SIGNIFICANT CHANGE UP (ref 0.7–1.5)
DIFF PNL FLD: NEGATIVE — SIGNIFICANT CHANGE UP
EOSINOPHIL # BLD AUTO: 0.04 K/UL — SIGNIFICANT CHANGE UP (ref 0–0.7)
EOSINOPHIL NFR BLD AUTO: 0.6 % — SIGNIFICANT CHANGE UP (ref 0–8)
GLUCOSE SERPL-MCNC: 111 MG/DL — HIGH (ref 70–99)
GLUCOSE UR QL: NEGATIVE MG/DL — SIGNIFICANT CHANGE UP
HCT VFR BLD CALC: 41.1 % — SIGNIFICANT CHANGE UP (ref 37–47)
HGB BLD-MCNC: 13.8 G/DL — SIGNIFICANT CHANGE UP (ref 12–16)
IMM GRANULOCYTES NFR BLD AUTO: 0.1 % — SIGNIFICANT CHANGE UP (ref 0.1–0.3)
KETONES UR-MCNC: NEGATIVE — SIGNIFICANT CHANGE UP
LACTATE SERPL-SCNC: 1.1 MMOL/L — SIGNIFICANT CHANGE UP (ref 0.7–2)
LEUKOCYTE ESTERASE UR-ACNC: NEGATIVE — SIGNIFICANT CHANGE UP
LIDOCAIN IGE QN: 18 U/L — SIGNIFICANT CHANGE UP (ref 7–60)
LYMPHOCYTES # BLD AUTO: 0.75 K/UL — LOW (ref 1.2–3.4)
LYMPHOCYTES # BLD AUTO: 10.7 % — LOW (ref 20.5–51.1)
MCHC RBC-ENTMCNC: 28.9 PG — SIGNIFICANT CHANGE UP (ref 27–31)
MCHC RBC-ENTMCNC: 33.6 G/DL — SIGNIFICANT CHANGE UP (ref 32–37)
MCV RBC AUTO: 86 FL — SIGNIFICANT CHANGE UP (ref 81–99)
MONOCYTES # BLD AUTO: 0.49 K/UL — SIGNIFICANT CHANGE UP (ref 0.1–0.6)
MONOCYTES NFR BLD AUTO: 7 % — SIGNIFICANT CHANGE UP (ref 1.7–9.3)
NEUTROPHILS # BLD AUTO: 5.71 K/UL — SIGNIFICANT CHANGE UP (ref 1.4–6.5)
NEUTROPHILS NFR BLD AUTO: 81.5 % — HIGH (ref 42.2–75.2)
NITRITE UR-MCNC: NEGATIVE — SIGNIFICANT CHANGE UP
NRBC # BLD: 0 /100 WBCS — SIGNIFICANT CHANGE UP (ref 0–0)
PH UR: 7 — SIGNIFICANT CHANGE UP (ref 5–8)
PLATELET # BLD AUTO: 277 K/UL — SIGNIFICANT CHANGE UP (ref 130–400)
POTASSIUM SERPL-MCNC: 3.6 MMOL/L — SIGNIFICANT CHANGE UP (ref 3.5–5)
POTASSIUM SERPL-SCNC: 3.6 MMOL/L — SIGNIFICANT CHANGE UP (ref 3.5–5)
PROT SERPL-MCNC: 6.7 G/DL — SIGNIFICANT CHANGE UP (ref 6–8)
PROT UR-MCNC: NEGATIVE MG/DL — SIGNIFICANT CHANGE UP
RBC # BLD: 4.78 M/UL — SIGNIFICANT CHANGE UP (ref 4.2–5.4)
RBC # FLD: 12.4 % — SIGNIFICANT CHANGE UP (ref 11.5–14.5)
SARS-COV-2 RNA SPEC QL NAA+PROBE: SIGNIFICANT CHANGE UP
SODIUM SERPL-SCNC: 135 MMOL/L — SIGNIFICANT CHANGE UP (ref 135–146)
SP GR SPEC: 1.01 — SIGNIFICANT CHANGE UP (ref 1.01–1.03)
UROBILINOGEN FLD QL: 1 MG/DL
WBC # BLD: 7.01 K/UL — SIGNIFICANT CHANGE UP (ref 4.8–10.8)
WBC # FLD AUTO: 7.01 K/UL — SIGNIFICANT CHANGE UP (ref 4.8–10.8)

## 2021-09-29 PROCEDURE — 99284 EMERGENCY DEPT VISIT MOD MDM: CPT

## 2021-09-29 RX ORDER — ACETAMINOPHEN 500 MG
975 TABLET ORAL ONCE
Refills: 0 | Status: COMPLETED | OUTPATIENT
Start: 2021-09-29 | End: 2021-09-29

## 2021-09-29 RX ORDER — ONDANSETRON 8 MG/1
4 TABLET, FILM COATED ORAL ONCE
Refills: 0 | Status: COMPLETED | OUTPATIENT
Start: 2021-09-29 | End: 2021-09-29

## 2021-09-29 RX ORDER — SODIUM CHLORIDE 9 MG/ML
1000 INJECTION, SOLUTION INTRAVENOUS ONCE
Refills: 0 | Status: COMPLETED | OUTPATIENT
Start: 2021-09-29 | End: 2021-09-29

## 2021-09-29 RX ADMIN — SODIUM CHLORIDE 1000 MILLILITER(S): 9 INJECTION, SOLUTION INTRAVENOUS at 22:50

## 2021-09-29 RX ADMIN — Medication 975 MILLIGRAM(S): at 23:48

## 2021-09-29 RX ADMIN — ONDANSETRON 4 MILLIGRAM(S): 8 TABLET, FILM COATED ORAL at 22:50

## 2021-09-29 NOTE — ED PROVIDER NOTE - PATIENT PORTAL LINK FT
You can access the FollowMyHealth Patient Portal offered by [facility] by registering at the following website: [email or web address]. By joining [employer]’s FollowMyHealth portal, you will also be able to view your health information using other applications (apps) compatible with our system.

## 2021-09-29 NOTE — ED PROVIDER NOTE - PROGRESS NOTE DETAILS
ck: patient tolerating po. rpt abd exam soft, non-tender, non-distended. educated on signs and symptoms to be aware of that should prompt return to the er.

## 2021-09-29 NOTE — ED PROVIDER NOTE - PROVIDER TOKENS
PROVIDER:[TOKEN:[[id number]],FOLLOWUP:[Routine]],PROVIDER:[TOKEN:[[id number]],FOLLOWUP:[1-3 Days]]

## 2021-09-29 NOTE — ED PROVIDER NOTE - OBJECTIVE STATEMENT
31 year old female, no past medical history, who presents with vomiting. patient woke up this morning feeling nauseous, progressively worsened throughout day, attempted to eat/drink followed by several episodes of nb/nb emesis and loose stools. denies chills, headache, neck pain, chest pain, SOB, back pain, urinary symptoms, vaginal bleeding/discharge. denies recent travel, recent sick contacts, recent new foods. no hx similar. no hx abd surgeries. patient completed menstrual cycle yesterday.

## 2021-09-29 NOTE — ED PROVIDER NOTE - PHYSICAL EXAMINATION
CONSTITUTIONAL: Well-developed; well-nourished; in no acute distress, nontoxic appearing  SKIN: skin exam is warm and dry  HEAD: Normocephalic; atraumatic  EYES: PERRL, conjunctiva and sclera clear  ENT: Dry MM  NECK: ROM intact.  CARD: S1, S2 normal, no murmur  RESP: No wheezes, rales or rhonchi. Good air movement bilaterally  ABD: soft; non-distended; non-tender. No Rebound, No guarding. no CVAT   EXT: Normal ROM.    NEURO: awake, alert, following commands, oriented, grossly unremarkable. No Focal deficits. GCS 15.   PSYCH: Cooperative, appropriate.

## 2021-09-29 NOTE — ED ADULT TRIAGE NOTE - CHIEF COMPLAINT QUOTE
pt c/o nausea and vomiting that started this morning at about 6 am. has not been able to keep food or liquids down since and reports multiple episodes of diarrhea. fever at home 100.4

## 2021-09-29 NOTE — ED PROVIDER NOTE - CARE PROVIDERS DIRECT ADDRESSES
,[email or web address]

## 2021-09-29 NOTE — ED ADULT TRIAGE NOTE - HEIGHT IN CM
Speech Therapy Video Visit - Progress Note     This patient contact was made to address the patients Speech Therapy  needs.  He is an established patient of mine. The patient's identity has been established.     Patient was informed of the requirements, reasons for the rehab video-visit due to COVID-19 crisis and right to refuse the visit at any time. Patient was informed of the option to be seen for an in-person clinic visit.  In-person clinic visit risk and requirements were reviewed with patient related to COVID-19.     Prior to initiating the virtual visit, the patient was informed that verbal consent to treat includes permission to submit claims to their insurance on their behalf for the services received.     Patient verbally consented to video-visit. Video-visit was not recorded.    Therapist location: [place]  Patient location[place]  Platform used: Zoom     Visit: 18  Referred by: eHlga Forde MD  Medical Diagnosis (from order):       Diagnosis Information             Diagnosis      332.0 (ICD-9-CM) - G20 (ICD-10-CM) - Idiopathic Parkinson's disease (CMS/[facility])      784.49 (ICD-9-CM) - R49.8 (ICD-10-CM) - Hypophonia                  Treatment Diagnosis: Hypokinetic dysarthria  Diagnosis Precautions: Reduced speech intelligibility.  Chart reviewed at time of initial evaluation (relevant co-morbidities, allergies, tests and medications listed): Patient benefited from speech therapy in the past during 2015, 2017 and 2020.     SUBJECTIVE   \"People are having trouble understanding me over the phone. Some people say I talk to fast. My wife says that she has trouble understanding me.\"  2/16/21: \"My wife says that I talk too fast.\"  2/18/21: \"It's hard for me to slow down.\"  2/24/21: \"I spoke with the doctor during a virtual visit. He didn't understand me at all. It was so frustrating!\"  2/26/21: \"I'm trying to pause and stop between phrases.\"  3/10/21: \"People have trouble understanding me on the  phone.\"  3/12/21:  \"I'm talking too fast now, aren't I.\"  3/17/21 \"Am I talking too fast?\"  3/23/21: \"Can you understand me?\"  3/25/21: \"I tend to go too fast.\"  3/30/21: \"People have trouble understanding me on the phone.\"  3/31/21: \"I took a cab yesterday and the  had trouble understanding me.\"  4/6/21: \"I talked with my daughter on Zoom and she understood me. Kayla understands me most of the time in person.\"  4/9/21: Patient difficult to understand on voice mail.  4/15/21: \"Things aren't going so well today. I'm at my daughter's.\":   4/21/21: \"My daughter says that she understands me a little better.\"  4/28/21: \"I still think I talk too fast but I'm a little easier to understand.\"  Pain: patient reports pain is not an issue/concern     OBJECTIVE   Repetition of 8-12 word sentences with focus on loud production-100% Oral reading of paragraphs-70%. Tends to run sentences together without adequate pausing. Sentence-length responses to questions-90% intelligible; with only occasional cues to increase loudness.  Conversation much improved with patient focusing on loud voice. approximately 80% intelligible with occasional cues to increase loudness.     ASSESSMENT   Improvement in speech intelligibility with louder voice. Improved carryover today in conversation due to his ability to maintain loudness throughout entire session.      PLAN   Continue therapy via videovisits. Patient to practice loud oral reading of sentences pausing between phrases.      Goals:    To be obtained by end of this plan of care:  LTG  Patient to produce sentence responses to questions with 90% appropriate loudness and fluency.  Patient to participate in conversational speech with 80% adequate loudness and fluency.  90% speech intelligibility in quiet environment.  80% speech intelligibility in environment with background noise.     STG  Patient able to perform home exercise program independently.MET  Patient to produce functional phrases  with 100% intelligibility MET  Patient to sustain phonation for 15 seconds on one breath. MET  Patient able to answer questions with phrase response with 90% intelligibility MET  Oral reading of paragraphs 80% intelligibility.      Procedures and total treatment time documented in Time Entry flowsheet.     165.1

## 2021-09-29 NOTE — ED PROVIDER NOTE - NS ED ROS FT
Review of Systems:  	•	CONSTITUTIONAL: no fever, no chills  	•	SKIN: no rash  	•	ENT: no sore throat   	•	RESPIRATORY: no shortness of breath, no cough  	•	CARDIAC: no chest pain, no palpitations  	•	GI: no abd pain, +nausea, +vomiting, +diarrhea, no constipation  	•	GENITO-URINARY: no discharge, no dysuria; no hematuria, no increased urinary frequency  	•	MUSCULOSKELETAL: no joint paint, no swelling, no redness  	•	NEUROLOGIC: no headache, no syncope, no weakness  	•	PSYCH: no anxiety, non suicidal, non homicidal, no hallucination, no depression

## 2021-09-29 NOTE — ED PROVIDER NOTE - CARE PROVIDER_API CALL
[facility]  GASTROENTEROLOGY  [address] [place] [id number]  Phone: [phone]  Fax: [phone]  Follow Up Time: Routine    Ellie Soto)  Internal Medicine  [address] [place] [address]  Phone: [phone]  Fax: [phone]  Follow Up Time: 1-3 Days

## 2021-09-29 NOTE — ED PROVIDER NOTE - NSFOLLOWUPINSTRUCTIONS_ED_ALL_ED_FT
Please follow up with your primary care doctor and gastroenterologist in 1-3 days  Please be aware of any new or worsening signs or symptoms that should prompt your return to the ER.      Acute Nausea and Vomiting    WHAT YOU NEED TO KNOW:    Acute nausea and vomiting start suddenly, worsen quickly, and last a short time.    DISCHARGE INSTRUCTIONS:    Return to the emergency department if:     You see blood in your vomit or your bowel movements.      You have sudden, severe pain in your chest and upper abdomen after hard vomiting or retching.      You have swelling in your neck and chest.       You are dizzy, cold, and thirsty and your eyes and mouth are dry.      You are urinating very little or not at all.      You have muscle weakness, leg cramps, and trouble breathing.       Your heart is beating much faster than normal.       You continue to vomit for more than 48 hours.     Contact your healthcare provider if:     You have frequent dry heaves (vomiting but nothing comes out).      Your nausea and vomiting does not get better or go away after you use medicine.      You have questions or concerns about your condition or treatment.    Medicines: You may need any of the following:     Medicines may be given to calm your stomach and stop your vomiting. You may also need medicines to help you feel more relaxed or to stop nausea and vomiting caused by motion sickness.      Gastrointestinal stimulants are used to help empty your stomach and bowels. This may help decrease nausea and vomiting.      Take your medicine as directed. Contact your healthcare provider if you think your medicine is not helping or if you have side effects. Tell him or her if you are allergic to any medicine. Keep a list of the medicines, vitamins, and herbs you take. Include the amounts, and when and why you take them. Bring the list or the pill bottles to follow-up visits. Carry your medicine list with you in case of an emergency.    Prevent or manage acute nausea and vomiting:     Do not drink alcohol. Alcohol may upset or irritate your stomach. Too much alcohol can also cause acute nausea and vomiting.      Control stress. Headaches due to stress may cause nausea and vomiting. Find ways to relax and manage your stress. Get more rest and sleep.      Drink more liquids as directed. Vomiting can lead to dehydration. It is important to drink more liquids to help replace lost body fluids. Ask your healthcare provider how much liquid to drink each day and which liquids are best for you. Your provider may recommend that you drink an oral rehydration solution (ORS). ORS contains water, salts, and sugar that are needed to replace the lost body fluids. Ask what kind of ORS to use, how much to drink, and where to get it.      Eat smaller meals, more often. Eat small amounts of food every 2 to 3 hours, even if you are not hungry. Food in your stomach may decrease your nausea.      Talk to your healthcare provider before you take over-the-counter (OTC) medicines. These medicines can cause serious problems if you use certain other medicines, or you have a medical condition. You may have problems if you use too much or use them for longer than the label says. Follow directions on the label carefully.     Follow up with your healthcare provider as directed: Write down your questions so you remember to ask them during your follow-up visits.       © Copyright [employer] 2019 All illustrations and images included in CareNotes are the copyrighted property of [employer] or [employer].        Acute Diarrhea    WHAT YOU NEED TO KNOW:    Acute diarrhea starts quickly and lasts a short time, usually 1 to 3 days. It can last up to 2 weeks. You may not be able to control your diarrhea. Acute diarrhea usually stops on its own.     DISCHARGE INSTRUCTIONS:    Return to the emergency department if:     You feel confused.       Your heartbeat is faster than usual.       Your eyes look deeply sunken, or you have no tears when you cry.       You urinate less than usual, or your urine is dark yellow.       You have blood or mucus in your bowel movements.      You have severe abdominal pain.       You are unable to drink any liquids.     Contact your healthcare provider if:     Your symptoms do not get better with treatment.       You have a fever higher than 101.3°F (38.5°C).       You have trouble eating and drinking because you are vomiting.       Your diarrhea does not get better in 7 days.       You have questions or concerns about your condition or care.     Follow up with your healthcare provider as directed: Write down your questions so you remember to ask them during your visits.     Medicines:    Diarrhea medicine is an over-the-counter medicine that helps slow or stop your diarrhea. Do not take this medicine unless your healthcare provider says it is okay.       Antibiotics may be given to help treat an infection caused by bacteria.       Antiparasitics may be given to treat an infection caused by parasites.       Take your medicine as directed. Contact your healthcare provider if you think your medicine is not helping or if you have side effects. Tell him of her if you are allergic to any medicine. Keep a list of the medicines, vitamins, and herbs you take. Include the amounts, and when and why you take them. Bring the list or the pill bottles to follow-up visits. Carry your medicine list with you in case of an emergency.    Self-care:     Drink liquids as directed. Liquids will help prevent dehydration caused by diarrhea. Ask your healthcare provider how much liquid to drink each day and which liquids are best for you. You may need to drink an oral rehydration solution (ORS). An ORS has the right amounts of water, salts, and sugar you need to replace body fluids. You can buy an ORS at most grocery stores and pharmacies.       Eat foods that are easy to digest. Examples include rice, lentils, cereal, bananas, potatoes, and bread. It also includes some fruits (bananas, melon), well-cooked vegetables, and lean meats. Do not eat foods high in fiber, fat, and sugar. Do not drink alcohol until your diarrhea is gone.     Prevent acute diarrhea:     Wash your hands often. Use soap and water. Wash your hands before you eat or prepare food. Also wash your hands after you use the bathroom. Use an alcohol-based hand gel when soap and water are not available. Handwashing           Keep bathroom surfaces clean. This helps prevent the spread of germs that cause acute diarrhea.       Wash fruits and vegetables well before you eat them. This can help remove germs that cause diarrhea. If possible, remove the skin from fruits and vegetables, or cook them well before you eat them.       Cook meat and poultry as directed. Meat includes beef and pork. Poultry includes chicken, turkey, and duck.  Cook ground meat to 160°F.       Cook ground poultry, whole poultry, or cuts of poultry to at least 165°F. Remove the poultry from heat. Let it stand for 3 minutes before you eat it.       Cook whole cuts of meat other than poultry to at least 145°F. Remove the meat from heat. Let it stand for 3 minutes before you eat it.       Wash dishes that have touched raw meat or poultry with hot water and soap. This includes cutting boards, utensils, dishes, and serving containers.       Place raw or cooked meat or poultry in the refrigerator as soon as possible. Bacteria can grow in meat or poultry that is left at room temperature too long.       Do not eat raw or undercooked oysters, clams, or mussels. These foods may be contaminated and cause infection.       Drink only filtered or treated water when you travel. Do not put ice in your drinks. Drink bottled water whenever possible.          © Copyright [employer] 2019 All illustrations and images included in CareNotes are the copyrighted property of [employer] or [employer].

## 2021-09-29 NOTE — ED PROVIDER NOTE - CLINICAL SUMMARY MEDICAL DECISION MAKING FREE TEXT BOX
Healthy 30 yo F, here for assessment of nausea, vomiting, diarrhea -- sx began today with nausea, progressively worsened with multiple episodes of NBNB vomiting, difficulty tolerating PO, loose stools without blood.     No abdominal pain, chest pain, dyspnea. No sick contacts, recent travel.    VS notable for fever, on exam well appearing, in no distress, clear lungs, RRR, soft, NT, Nd abdomen.    Labs unremarkable, UA negative. Feeling much better, tolerating PO.    Will dc with continued monitoring of sx, likely viral, no signs of appendicitis, colitis.     Advised on return precautions, follow up.

## 2021-10-01 LAB
CULTURE RESULTS: SIGNIFICANT CHANGE UP
SPECIMEN SOURCE: SIGNIFICANT CHANGE UP

## 2022-02-17 NOTE — ED ADULT NURSE NOTE - CHIEF COMPLAINT QUOTE
[FreeTextEntry1] : 1) pap performed - if BV noted will inform patient\par 2) rx for screening mammo issued\par \par Return to office in one year, sooner prn pt c/o nausea and vomiting that started this morning at about 6 am. has not been able to keep food or liquids down since and reports multiple episodes of diarrhea. fever at home 100.4

## 2022-03-15 ENCOUNTER — EMERGENCY (EMERGENCY)
Facility: HOSPITAL | Age: 32
LOS: 0 days | Discharge: HOME | End: 2022-03-15
Attending: EMERGENCY MEDICINE | Admitting: EMERGENCY MEDICINE
Payer: COMMERCIAL

## 2022-03-15 VITALS
HEART RATE: 61 BPM | RESPIRATION RATE: 18 BRPM | WEIGHT: 199.96 LBS | SYSTOLIC BLOOD PRESSURE: 121 MMHG | TEMPERATURE: 102 F | DIASTOLIC BLOOD PRESSURE: 59 MMHG | HEIGHT: 65 IN | OXYGEN SATURATION: 98 %

## 2022-03-15 VITALS
OXYGEN SATURATION: 99 % | SYSTOLIC BLOOD PRESSURE: 102 MMHG | RESPIRATION RATE: 18 BRPM | TEMPERATURE: 99 F | HEART RATE: 76 BPM | DIASTOLIC BLOOD PRESSURE: 65 MMHG

## 2022-03-15 DIAGNOSIS — S83.519A SPRAIN OF ANTERIOR CRUCIATE LIGAMENT OF UNSPECIFIED KNEE, INITIAL ENCOUNTER: Chronic | ICD-10-CM

## 2022-03-15 DIAGNOSIS — R50.9 FEVER, UNSPECIFIED: ICD-10-CM

## 2022-03-15 DIAGNOSIS — M79.10 MYALGIA, UNSPECIFIED SITE: ICD-10-CM

## 2022-03-15 DIAGNOSIS — J02.9 ACUTE PHARYNGITIS, UNSPECIFIED: ICD-10-CM

## 2022-03-15 DIAGNOSIS — R74.01 ELEVATION OF LEVELS OF LIVER TRANSAMINASE LEVELS: ICD-10-CM

## 2022-03-15 DIAGNOSIS — Z20.822 CONTACT WITH AND (SUSPECTED) EXPOSURE TO COVID-19: ICD-10-CM

## 2022-03-15 LAB
ALBUMIN SERPL ELPH-MCNC: 4 G/DL — SIGNIFICANT CHANGE UP (ref 3.5–5.2)
ALP SERPL-CCNC: 211 U/L — HIGH (ref 30–115)
ALT FLD-CCNC: 118 U/L — HIGH (ref 0–41)
ANION GAP SERPL CALC-SCNC: 13 MMOL/L — SIGNIFICANT CHANGE UP (ref 7–14)
APPEARANCE UR: CLEAR — SIGNIFICANT CHANGE UP
AST SERPL-CCNC: 109 U/L — HIGH (ref 0–41)
BASOPHILS # BLD AUTO: 0.03 K/UL — SIGNIFICANT CHANGE UP (ref 0–0.2)
BASOPHILS NFR BLD AUTO: 0.4 % — SIGNIFICANT CHANGE UP (ref 0–1)
BILIRUB SERPL-MCNC: 0.3 MG/DL — SIGNIFICANT CHANGE UP (ref 0.2–1.2)
BILIRUB UR-MCNC: NEGATIVE — SIGNIFICANT CHANGE UP
BUN SERPL-MCNC: 20 MG/DL — SIGNIFICANT CHANGE UP (ref 10–20)
CALCIUM SERPL-MCNC: 9 MG/DL — SIGNIFICANT CHANGE UP (ref 8.5–10.1)
CHLORIDE SERPL-SCNC: 100 MMOL/L — SIGNIFICANT CHANGE UP (ref 98–110)
CO2 SERPL-SCNC: 20 MMOL/L — SIGNIFICANT CHANGE UP (ref 17–32)
COLOR SPEC: YELLOW — SIGNIFICANT CHANGE UP
CREAT SERPL-MCNC: 1 MG/DL — SIGNIFICANT CHANGE UP (ref 0.7–1.5)
DIFF PNL FLD: NEGATIVE — SIGNIFICANT CHANGE UP
EGFR: 78 ML/MIN/1.73M2 — SIGNIFICANT CHANGE UP
EOSINOPHIL # BLD AUTO: 0.01 K/UL — SIGNIFICANT CHANGE UP (ref 0–0.7)
EOSINOPHIL NFR BLD AUTO: 0.1 % — SIGNIFICANT CHANGE UP (ref 0–8)
GLUCOSE SERPL-MCNC: 119 MG/DL — HIGH (ref 70–99)
GLUCOSE UR QL: NEGATIVE MG/DL — SIGNIFICANT CHANGE UP
HCG SERPL QL: NEGATIVE — SIGNIFICANT CHANGE UP
HCT VFR BLD CALC: 38.5 % — SIGNIFICANT CHANGE UP (ref 37–47)
HGB BLD-MCNC: 12.9 G/DL — SIGNIFICANT CHANGE UP (ref 12–16)
IMM GRANULOCYTES NFR BLD AUTO: 0.2 % — SIGNIFICANT CHANGE UP (ref 0.1–0.3)
KETONES UR-MCNC: NEGATIVE — SIGNIFICANT CHANGE UP
LACTATE SERPL-SCNC: 1 MMOL/L — SIGNIFICANT CHANGE UP (ref 0.7–2)
LEUKOCYTE ESTERASE UR-ACNC: NEGATIVE — SIGNIFICANT CHANGE UP
LYMPHOCYTES # BLD AUTO: 0.62 K/UL — LOW (ref 1.2–3.4)
LYMPHOCYTES # BLD AUTO: 7.7 % — LOW (ref 20.5–51.1)
MCHC RBC-ENTMCNC: 28.2 PG — SIGNIFICANT CHANGE UP (ref 27–31)
MCHC RBC-ENTMCNC: 33.5 G/DL — SIGNIFICANT CHANGE UP (ref 32–37)
MCV RBC AUTO: 84.1 FL — SIGNIFICANT CHANGE UP (ref 81–99)
MONOCYTES # BLD AUTO: 1 K/UL — HIGH (ref 0.1–0.6)
MONOCYTES NFR BLD AUTO: 12.4 % — HIGH (ref 1.7–9.3)
NEUTROPHILS # BLD AUTO: 6.41 K/UL — SIGNIFICANT CHANGE UP (ref 1.4–6.5)
NEUTROPHILS NFR BLD AUTO: 79.2 % — HIGH (ref 42.2–75.2)
NITRITE UR-MCNC: NEGATIVE — SIGNIFICANT CHANGE UP
NRBC # BLD: 0 /100 WBCS — SIGNIFICANT CHANGE UP (ref 0–0)
PH UR: 6 — SIGNIFICANT CHANGE UP (ref 5–8)
PLATELET # BLD AUTO: 277 K/UL — SIGNIFICANT CHANGE UP (ref 130–400)
POTASSIUM SERPL-MCNC: 3.9 MMOL/L — SIGNIFICANT CHANGE UP (ref 3.5–5)
POTASSIUM SERPL-SCNC: 3.9 MMOL/L — SIGNIFICANT CHANGE UP (ref 3.5–5)
PROT SERPL-MCNC: 6.8 G/DL — SIGNIFICANT CHANGE UP (ref 6–8)
PROT UR-MCNC: NEGATIVE MG/DL — SIGNIFICANT CHANGE UP
RAPID RVP RESULT: SIGNIFICANT CHANGE UP
RBC # BLD: 4.58 M/UL — SIGNIFICANT CHANGE UP (ref 4.2–5.4)
RBC # FLD: 13.6 % — SIGNIFICANT CHANGE UP (ref 11.5–14.5)
SARS-COV-2 RNA SPEC QL NAA+PROBE: SIGNIFICANT CHANGE UP
SODIUM SERPL-SCNC: 133 MMOL/L — LOW (ref 135–146)
SP GR SPEC: 1.01 — SIGNIFICANT CHANGE UP (ref 1.01–1.03)
UROBILINOGEN FLD QL: 0.2 MG/DL — SIGNIFICANT CHANGE UP
WBC # BLD: 8.09 K/UL — SIGNIFICANT CHANGE UP (ref 4.8–10.8)
WBC # FLD AUTO: 8.09 K/UL — SIGNIFICANT CHANGE UP (ref 4.8–10.8)

## 2022-03-15 PROCEDURE — 99284 EMERGENCY DEPT VISIT MOD MDM: CPT

## 2022-03-15 RX ORDER — ACETAMINOPHEN 500 MG
650 TABLET ORAL ONCE
Refills: 0 | Status: COMPLETED | OUTPATIENT
Start: 2022-03-15 | End: 2022-03-15

## 2022-03-15 RX ORDER — SODIUM CHLORIDE 9 MG/ML
2000 INJECTION, SOLUTION INTRAVENOUS ONCE
Refills: 0 | Status: COMPLETED | OUTPATIENT
Start: 2022-03-15 | End: 2022-03-15

## 2022-03-15 RX ORDER — KETOROLAC TROMETHAMINE 30 MG/ML
15 SYRINGE (ML) INJECTION ONCE
Refills: 0 | Status: DISCONTINUED | OUTPATIENT
Start: 2022-03-15 | End: 2022-03-15

## 2022-03-15 RX ADMIN — Medication 15 MILLIGRAM(S): at 21:40

## 2022-03-15 RX ADMIN — Medication 650 MILLIGRAM(S): at 21:40

## 2022-03-15 RX ADMIN — SODIUM CHLORIDE 2000 MILLILITER(S): 9 INJECTION, SOLUTION INTRAVENOUS at 21:31

## 2022-03-15 NOTE — ED PROVIDER NOTE - OBJECTIVE STATEMENT
30 yo female, no pmh, presents to ed for sore throat, fever, body aches, mild, aching, no radiation, started today. denies cp, sob, abd pain, nvd, neck pain, back pain, dysuria, hematuria.

## 2022-03-15 NOTE — ED PROVIDER NOTE - NSFOLLOWUPINSTRUCTIONS_ED_ALL_ED_FT
PLEASE FOLLOW UP WITH PCP AND HAVE LIVER ENZYMES REPEATED.       Fever    A fever is an increase in the body's temperature above 100.4°F (38°C) or higher. In adults and children older than three months, a brief mild or moderate fever generally has no long-term effect, and it usually does not require treatment. Many times, fevers are the result of viral infections, which are self-resolving.  However, certain symptoms or diagnostic tests may suggest a bacterial infection that may respond to antibiotics. Take medications as directed by your health care provider.    SEEK IMMEDIATE MEDICAL CARE IF YOU OR YOUR CHILD HAVE ANY OF THE FOLLOWING SYMPTOMS : shortness of breath, seizure, rash/stiff neck/headache, severe abdominal pain, persistent vomiting, any signs of dehydration, or if your child has a fever for over five (5) days.

## 2022-03-15 NOTE — ED PROVIDER NOTE - PATIENT PORTAL LINK FT
You can access the FollowMyHealth Patient Portal offered by [facility] by registering at the following website: [email or web address]. By joining [employer]’s FollowMyHealth portal, you will also be able to view your health information using other applications (apps) compatible with our system.

## 2022-03-15 NOTE — ED PROVIDER NOTE - CARE PROVIDERS DIRECT ADDRESSES
[email or web address]

## 2022-03-15 NOTE — ED PROVIDER NOTE - PHYSICAL EXAMINATION
Physical Exam    Vital Signs: I have reviewed the initial vital signs.  Constitutional: well-nourished, appears stated age, no acute distress  Eyes: Conjunctiva pink, Sclera clear  ENT: mild erythema, no exudates, uvula midline, no lymphadenopathy.   Cardiovascular: S1 and S2, regular rate, regular rhythm, well-perfused extremities, radial pulses equal and 2+  Respiratory: unlabored respiratory effort, clear to auscultation bilaterally no wheezing, rales and rhonchi  Gastrointestinal: soft, non-tender abdomen, no pulsatile mass, normal bowl sounds  Musculoskeletal: supple neck, no lower extremity edema, no midline tenderness  Integumentary: warm, dry, no rash  Neurologic: awake, alert, nvi

## 2022-03-15 NOTE — ED PROVIDER NOTE - PROVIDER TOKENS
PROVIDER:[TOKEN:[[id number]],FOLLOWUP:[1-3 Days]],FREE:[LAST:[your pcp],PHONE:[(   )    -],FAX:[(   )    -],FOLLOWUP:[1-3 Days]]

## 2022-03-15 NOTE — ED PROVIDER NOTE - NS ED ROS FT
Constitutional: (+) fever, (+) chills  Eyes: (-) visual changes  ENT: (-) nasal congestions  Cardiovascular: (-) chest pain, (-) syncope  Respiratory: (-) cough, (-) shortness of breath, (-) dyspnea,   Gastrointestinal: (-) vomiting, (-) diarrhea, (-)nausea,  Musculoskeletal: (-) neck pain, (-) back pain, (-) joint pain,  Integumentary: (-) rash, (-) edema, (-) bruises  Neurological: (-) headache, (-) loc, (-) dizziness, (-) tingling, (-)numbness  Peripheral Vascular: (-) leg swelling  :  (-)dysuria,  (-) hematuria  Allergic/Immunologic: (-) pruritus

## 2022-03-15 NOTE — ED ADULT NURSE NOTE - OBJECTIVE STATEMENT
Pt a&ox3, in ED for fever which has not been resolved with Tylenol or Motrin, headache and sore throat x today, no SOB, unlabored breathing, equal rise & fall, able to speak in full sentences, denies pain, no N/V/D. Denies PMH/PSH

## 2022-03-15 NOTE — ED PROVIDER NOTE - NS ED ATTENDING STATEMENT MOD
This was a shared visit with the JEREMIAH. I reviewed and verified the documentation and independently performed the documented:

## 2022-03-15 NOTE — ED PROVIDER NOTE - CLINICAL SUMMARY MEDICAL DECISION MAKING FREE TEXT BOX
31yF  pw  sore throat fever started   this morning,  aw  fever myalgia.  no neck pain  no  pain with rom  pharynx  erythematous no exudate no  lad. Pt comfortable no drooling no  change in voice,   labs reviewed  elevated LFTs  - pt will follow up with her PMD and GI for lfts   -   rvp negative -  pt tolerating PO   Patient to be discharged from ED in well appearing condition. Any available test results were discussed with and printed  for patient.  Verbal instructions given, including instructions to return to ED immediately for any new, worsening, or concerning symptoms. Limitations of ED work up discussed.  Patient reports understanding of above with capacity and insight. Written discharge instructions additionally given, including follow-up plan.

## 2022-03-15 NOTE — ED PROVIDER NOTE - CARE PROVIDER_API CALL
Emilia Neri)  Gastroenterology  [address] [place] [address]  Phone: [phone]  Fax: [phone]  Follow Up Time: 1-3 Days    your pcp,   Phone: (   )    -  Fax: (   )    -  Follow Up Time: 1-3 Days

## 2022-03-16 PROBLEM — Z00.00 ENCOUNTER FOR PREVENTIVE HEALTH EXAMINATION: Status: ACTIVE | Noted: 2022-03-16

## 2022-03-17 LAB
CULTURE RESULTS: SIGNIFICANT CHANGE UP
SPECIMEN SOURCE: SIGNIFICANT CHANGE UP

## 2022-06-18 NOTE — ED ADULT NURSE NOTE - NSIMPLEMENTINTERV_GEN_ALL_ED
This was a shared visit with the SHEKHAR. I reviewed and verified the documentation and independently performed the documented:
Implemented All Universal Safety Interventions:  [place] to call system. Call bell, personal items and telephone within reach. Instruct patient to call for assistance. Room bathroom lighting operational. Non-slip footwear when patient is off stretcher. Physically safe environment: no spills, clutter or unnecessary equipment. Stretcher in lowest position, wheels locked, appropriate side rails in place.

## 2022-07-21 ENCOUNTER — APPOINTMENT (OUTPATIENT)
Dept: PLASTIC SURGERY | Facility: CLINIC | Age: 32
End: 2022-07-21

## 2022-07-21 VITALS — BODY MASS INDEX: 34.16 KG/M2 | HEIGHT: 65 IN | WEIGHT: 205 LBS

## 2022-07-21 DIAGNOSIS — N62 HYPERTROPHY OF BREAST: ICD-10-CM

## 2022-07-21 PROCEDURE — 99203 OFFICE O/P NEW LOW 30 MIN: CPT

## 2022-07-21 RX ORDER — ELECTROLYTES/DEXTROSE
SOLUTION, ORAL ORAL
Refills: 0 | Status: ACTIVE | COMMUNITY

## 2022-07-21 NOTE — HISTORY OF PRESENT ILLNESS
[FreeTextEntry1] : 31 yo otherwise healthy F with no significant PMHx who presents today for breast reduction consultation. Patient wears a 38 G-F cus size and presents c/o heavy dense breasts causing neck, shoulder and back pain, poor posture, deep shoulder grooves from wearing bra straps, frequent skin rash in IMFs treated multiple times in the past with OTC topical remedies such as body powders and deodorants. No PT or chiropractic treatments. Takes NSAIDs regularly for back pain. Denies any nipple discharge, palpable nodules or skin changes. \par \par Desires a C-cup.  \par \par Family hx - mom with [facility], alive and well \par \par Occupation - PCA, RN student \par Nonsmoker

## 2022-07-21 NOTE — ASSESSMENT
[FreeTextEntry1] : 33 yo F with symptomatic macromastia who is a good candidate for reduction  macromastia. \par \par - will submit to insurance for authorization AFTER weight loss, TBD at next visit\par - recommend weight loss for risk factor modification \par \par Photos taken with patient permission.\par \par Patient is a good candidate for breast reduction.  Regarding the procedure, we discussed scarring, poor wound healing, keloid and/or hypertrophic scarring, diminished nipple sensation, diminished ability to breast feed (if appropriate), breast asymmetry, dissatisfaction with the outcome, need for additional surgery and possible nipple loss.  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

## 2022-07-21 NOTE — PHYSICAL EXAM
[Bra Size: _______] : Bra Size: [unfilled] [de-identified] : well developed female, NAD [de-identified] : NC/AT [de-identified] : unlabored breathing [de-identified] : JOANR [de-identified] : Bilateral hypertrophic breast R>L with Grade 2 ptosis, superior deflation, nipple sensation intact b/l, no palpable nodules or LAD [de-identified] : soft, nontender

## 2022-08-18 ENCOUNTER — APPOINTMENT (OUTPATIENT)
Dept: GASTROENTEROLOGY | Facility: CLINIC | Age: 32
End: 2022-08-18

## 2022-10-20 ENCOUNTER — APPOINTMENT (OUTPATIENT)
Dept: PLASTIC SURGERY | Facility: CLINIC | Age: 32
End: 2022-10-20

## 2022-11-23 ENCOUNTER — EMERGENCY (EMERGENCY)
Facility: HOSPITAL | Age: 32
LOS: 0 days | Discharge: HOME | End: 2022-11-23
Attending: EMERGENCY MEDICINE | Admitting: EMERGENCY MEDICINE

## 2022-11-23 VITALS
HEART RATE: 99 BPM | HEIGHT: 65 IN | WEIGHT: 199.96 LBS | TEMPERATURE: 100 F | DIASTOLIC BLOOD PRESSURE: 74 MMHG | SYSTOLIC BLOOD PRESSURE: 124 MMHG | OXYGEN SATURATION: 98 %

## 2022-11-23 DIAGNOSIS — R52 PAIN, UNSPECIFIED: ICD-10-CM

## 2022-11-23 DIAGNOSIS — J02.9 ACUTE PHARYNGITIS, UNSPECIFIED: ICD-10-CM

## 2022-11-23 DIAGNOSIS — S83.519A SPRAIN OF ANTERIOR CRUCIATE LIGAMENT OF UNSPECIFIED KNEE, INITIAL ENCOUNTER: Chronic | ICD-10-CM

## 2022-11-23 DIAGNOSIS — M79.10 MYALGIA, UNSPECIFIED SITE: ICD-10-CM

## 2022-11-23 DIAGNOSIS — Z20.822 CONTACT WITH AND (SUSPECTED) EXPOSURE TO COVID-19: ICD-10-CM

## 2022-11-23 LAB
FLUAV AG NPH QL: DETECTED
FLUBV AG NPH QL: SIGNIFICANT CHANGE UP
RSV RNA NPH QL NAA+NON-PROBE: SIGNIFICANT CHANGE UP
SARS-COV-2 RNA SPEC QL NAA+PROBE: SIGNIFICANT CHANGE UP

## 2022-11-23 PROCEDURE — 99284 EMERGENCY DEPT VISIT MOD MDM: CPT

## 2022-11-23 NOTE — ED PROVIDER NOTE - ATTENDING APP SHARED VISIT CONTRIBUTION OF CARE
Patient is a 32-year-old female who is vaccinated against flu and COVID who went to a wedding over the weekend where multiple people tested positive for flu and today she is having body aches and feeling flushed.  No fever no cough.  Notes a mild sore throat.    Exam: Normal pharynx, no acute distress  Plan: Flu swab

## 2022-11-23 NOTE — ED PROVIDER NOTE - NS ED ROS FT
CONST: (+) bodyachess  EYES: No pain, redness, drainage or visual changes.  ENT: No ear pain or discharge, nasal discharge or congestion. (+) sore throat  CARD: No chest pain, palpitations  RESP: No SOB, cough  SKIN: No rashes  NEURO: No headache, dizziness, paresthesias

## 2022-11-23 NOTE — ED PROVIDER NOTE - OBJECTIVE STATEMENT
31yo female with no significant PMHx, fully vaccinated against Covid-19 and influenza, presents c/o bodyaches and feeling flushed since this morning. Pt notes was at a wedding over the weekend ( 4 days prior) and noted multiple guests had reported they had tested (+) for the flu. Pt requesting flu swab. She reports mild sore throat as well but denies SOB, coughing, CP, fever or chills.

## 2022-11-23 NOTE — ED PROVIDER NOTE - PATIENT PORTAL LINK FT
You can access the FollowMyHealth Patient Portal offered by [facility] by registering at the following website: [email or web address]. By joining [employer]’s FollowMyHealth portal, you will also be able to view your health information using other applications (apps) compatible with our system.

## 2022-11-23 NOTE — ED ADULT NURSE NOTE - NSIMPLEMENTINTERV_GEN_ALL_ED
Implemented All Universal Safety Interventions:  [place] to call system. Call bell, personal items and telephone within reach. Instruct patient to call for assistance. Room bathroom lighting operational. Non-slip footwear when patient is off stretcher. Physically safe environment: no spills, clutter or unnecessary equipment. Stretcher in lowest position, wheels locked, appropriate side rails in place.

## 2022-11-23 NOTE — ED PROVIDER NOTE - PHYSICAL EXAMINATION
CONST: Well appearing in NAD  EYES: PERRL, EOMI, Sclera and conjunctiva clear.   ENT: No nasal discharge. TM's clear B/L without drainage. Oropharynx normal appearing, no erythema or exudates. Uvula midline.  NECK: Non-tender, no lymphadenopathy  CARD: Normal S1 S2; Normal rate and rhythm  RESP: Equal BS B/L, No wheezes, rhonchi or rales. No distress  SKIN: Warm, dry, no acute rashes. Good turgor  NEURO: A&Ox3, No focal deficits. Strength 5/5 with no sensory deficits. Steady gait

## 2023-01-10 NOTE — PRE-ANESTHESIA EVALUATION ADULT - NSANTHTIREDRD_ENT_A_CORE
As discussed , you should be OK to start off on the diuretics as requested by ENT. Please check your blood pressures atleast 3-4 times/ week so that we need to make sure to come down of Atenolol which we are using for migraine but not facing much episodes after your menopause recently.     Sent in as needed Meclizine for your vertigo since you have just completed the Medrol dosepak for your possible Menierre's disease.     ====================   No

## 2023-04-13 NOTE — ED PROVIDER NOTE - NS ED MD DISPO DISCHARGE
Impression: Vitreous degeneration, bilateral: H43.813. Plan: Symptoms of retinal detachment or tears were discussed in detail. If patient notices any symptoms discussed, contact office ASAP. Stable will continue to monitor. Home

## 2023-07-07 NOTE — ED ADULT NURSE NOTE - NS ED NOTE ABUSE RESPONSE YN
----- Message from SHARON Jade sent at 7/7/2023 10:08 AM CDT -----  Please notify the patient's mother that the culture grew Staphylococcus simulans, we do not have the sensitivity back yet ask the mother how the patient is doing on the antibiotic.  We started them on Omnicef.     Yes

## 2023-09-17 NOTE — ED ADULT NURSE NOTE - SUICIDE SCREENING QUESTION 1
Pt eating popsicle at dc. Pt discharged home with parent/guardian. Pt acting age appropriately, respirations regular and unlabored, cap refill less than two seconds. Skin pink, dry and warm. No further complaints at this time. Parent/guardian verbalized understanding of discharge paperwork and has no further questions at this time. Education provided about continuation of care, follow up care with PCP and medication administration with augmentin. Parent/guardian able to provide teach back about discharge instructions.        Sakina Galvez RN  09/17/23 [id number] No

## 2024-08-29 NOTE — ED ADULT NURSE NOTE - NSHISCREENINGQ1_ED_A_ED
[IPSS Score (0-40): ___] : IPSS score: [unfilled] [EPIC-CP Score (0-60): ___] : EPIC-CP score: [unfilled] [Negative] : Allergic/Immunologic [Anal Pain: Grade 0] : Anal Pain: Grade 0 [Constipation: Grade 0] : Constipation: Grade 0 [Proctitis: Grade 0] : Proctitis: Grade 0 [Rectal Pain: Grade 0] : Rectal Pain: Grade 0 [Hematuria: Grade 0] : Hematuria: Grade 0 [Urinary Incontinence: Grade 0] : Urinary Incontinence: Grade 0  [Urinary Retention: Grade 0] : Urinary Retention: Grade 0 No [Urinary Tract Pain: Grade 0] : Urinary Tract Pain: Grade 0 [Urinary Urgency: Grade 0] : Urinary Urgency: Grade 0 [Urinary Frequency: Grade 0] : Urinary Frequency: Grade 0 [Ejaculation Disorder: Grade 0] : Ejaculation Disorder: Grade 0 [Erectile Dysfunction: Grade 0] : Erectile Dysfunction: Grade 0

## 2024-12-19 NOTE — PACU DISCHARGE NOTE - NSCLINEINSERTRD_GEN_ALL_CORE
Patient's IV removed. AVS reviewed with patient by Katey PAYTON. Patient left with all belongings in stable condition.   
No

## 2025-03-05 NOTE — ED ADULT NURSE NOTE - NS ED NOTE ABUSE SUSPICION NEGLECT YN
Pt called to confirm she is scheduled for transportation for her scheduled appointment today.  I confirmed via round trip she is scheduled for transportation. She was thankful for the assistance   
No

## 2025-03-24 NOTE — ASU PREOP CHECKLIST - IDENTIFICATION BAND VERIFIED
I placed a referral to the cardiology office to schedule you for an appointment with the chest pain clinic.  Please asked them about a Holter monitor being placed that record your heart rhythm and rate.  Please continue to follow-up with the [facility] regarding POTS.   done